# Patient Record
Sex: FEMALE | Race: WHITE | NOT HISPANIC OR LATINO | URBAN - METROPOLITAN AREA
[De-identification: names, ages, dates, MRNs, and addresses within clinical notes are randomized per-mention and may not be internally consistent; named-entity substitution may affect disease eponyms.]

---

## 2021-10-15 ENCOUNTER — HOSPITAL ENCOUNTER (OUTPATIENT)
Dept: RADIOLOGY | Facility: MEDICAL CENTER | Age: 31
End: 2021-10-15

## 2023-04-11 ENCOUNTER — HOSPITAL ENCOUNTER (OUTPATIENT)
Facility: MEDICAL CENTER | Age: 33
End: 2023-04-11
Attending: OBSTETRICS & GYNECOLOGY
Payer: COMMERCIAL

## 2023-04-11 ENCOUNTER — GYNECOLOGY VISIT (OUTPATIENT)
Dept: OBGYN | Facility: CLINIC | Age: 33
End: 2023-04-11
Payer: COMMERCIAL

## 2023-04-11 VITALS
HEIGHT: 67 IN | SYSTOLIC BLOOD PRESSURE: 125 MMHG | BODY MASS INDEX: 24.55 KG/M2 | WEIGHT: 156.4 LBS | DIASTOLIC BLOOD PRESSURE: 79 MMHG

## 2023-04-11 DIAGNOSIS — Z01.419 WOMEN'S ANNUAL ROUTINE GYNECOLOGICAL EXAMINATION: Primary | ICD-10-CM

## 2023-04-11 DIAGNOSIS — R10.2 PELVIC PAIN: ICD-10-CM

## 2023-04-11 DIAGNOSIS — Z01.419 WOMEN'S ANNUAL ROUTINE GYNECOLOGICAL EXAMINATION: ICD-10-CM

## 2023-04-11 PROCEDURE — 88175 CYTOPATH C/V AUTO FLUID REDO: CPT

## 2023-04-11 PROCEDURE — 87624 HPV HI-RISK TYP POOLED RSLT: CPT

## 2023-04-11 PROCEDURE — 99385 PREV VISIT NEW AGE 18-39: CPT | Performed by: OBSTETRICS & GYNECOLOGY

## 2023-04-11 PROCEDURE — 99203 OFFICE O/P NEW LOW 30 MIN: CPT | Mod: 25 | Performed by: OBSTETRICS & GYNECOLOGY

## 2023-04-11 NOTE — PROGRESS NOTES
ANNUAL GYNECOLOGY VISIT    Chief Complaint  Gynecologic Exam (Annual)      Subjective  Shantell Cueto is a 32 y.o. female who presents today for an Annual Exam.  Her main concern today is that her cycles are very painful.  Her cycles occur monthly and last about 3 or 4 days.  She does pass some clots on the first couple days of her cycle.  She has tried taking Aleve prior to her cycle.  Ultimately, she does not want to take any medications because she worries about the long-term effects of taking medications.  She also complains about mood swings about 10 days before cycle.  She has tried oral contraceptive pills in the past.  She feels like she is very sensitive to oral contraceptive pills and that it impacts her mood.  She is not interested in taking any oral contraceptive pills at this time.  She also sometimes has a right-sided pelvic pain about a week before her cycle. She currently uses condoms for contraception.  She denies any abnormal vaginal discharge or irritation.  She declines STD testing today.    Preventive Care   S/p HPV vaccine  Last Mammogram: NA    Gynecology History and ROS  Current Sexual Activity: Yes  History of sexually transmitted diseases?  no  Abnormal discharge? No  Current Contraception:  condoms    Menstrual History  Patient's last menstrual period was 2023.  Periods are regular  q 28 days, lasting 3-4 days.   Clots or heavy flow: Yes  Dysmenorrhea: Yes  Intermenstrual bleeding/spotting: No  Significant pain with periods:Yes  Bothersome PMS symptoms: Yes  Significant Pelvic Pain: Yes      Pap History  Last pap smear:  pap negative  History of moderate or severe dysplasia: No    Cancer Risk Assessement:  Family history of:   - Breast cancer: cousin had breast cancer at a young age, no one else in her family has has breast cancer   - Ovarian cancer: no   - Uterine cancer: no   - Colon cancer: no    Obstetric History  OB History    Para Term  AB Living   0 0 0 0 0 0    SAB IAB Ectopic Molar Multiple Live Births   0 0 0 0 0 0       Past Medical History  History reviewed. No pertinent past medical history.    Past Surgical History  History reviewed. No pertinent surgical history.    Social History  Social History     Socioeconomic History    Marital status: Single     Spouse name: Not on file    Number of children: Not on file    Years of education: Not on file    Highest education level: Not on file   Occupational History    Not on file   Tobacco Use    Smoking status: Never    Smokeless tobacco: Never   Vaping Use    Vaping Use: Never used   Substance and Sexual Activity    Alcohol use: Yes     Alcohol/week: 0.6 - 1.2 oz     Types: 1 - 2 Standard drinks or equivalent per week    Drug use: Yes     Types: Oral, Marijuana    Sexual activity: Yes     Partners: Male     Birth control/protection: Condom   Other Topics Concern    Not on file   Social History Narrative    Not on file     Social Determinants of Health     Financial Resource Strain: Not on file   Food Insecurity: Not on file   Transportation Needs: Not on file   Physical Activity: Not on file   Stress: Not on file   Social Connections: Not on file   Intimate Partner Violence: Not on file   Housing Stability: Not on file       Family History  Family History   Problem Relation Age of Onset    No Known Problems Mother     Cancer Father         prostate       Home Medications  No current outpatient medications on file prior to visit.     No current facility-administered medications on file prior to visit.       Allergies/Reactions  No Known Allergies    ROS  Positive ROS: Negative except as stated in the HPI  Gen: no fevers or chills, no significant weight loss or gain, excessive fatigue  Respiratory:  no cough or dyspnea  Cardiac:  no chest pain, no palpitations, no syncope  Breast: no breast discharge, pain, lump or skin changes  GI:  no heartburn, no nausea or vomiting  Urinary: no dysuria, urgency, frequency, incontinence  "  Psych: sometimes anxiety, uses CBD  Neuro: no migraines with aura, fainting spells, numbness or tingling  Extremities: no joint pain, persistently swollen ankles, recurrent leg cramps      Physical Examination:  Vital Signs:   Vitals:    04/11/23 1006   BP: 125/79   BP Location: Left arm   Patient Position: Sitting   BP Cuff Size: Adult   Weight: 156 lb 6.4 oz   Height: 5' 7\"     Body mass index is 24.5 kg/m².    Constitutional: The patient is well developed and well nourished.  Psychiatric: Patient is oriented to time place and person.   Skin: No rash observed.  Neck: Appears symmetric.  Respiratory: normal effort  Breast: Inspection reveals no asymetry or nipple discharge, no skin thickening, dimpling or erythema.  Palpation demonstrates no masses.  Abdomen: Soft, non-tender.  Pelvic Exam:     Vulva: external female genitalia are normal in appearance. No lesions    Urethra - no lesions, no erythema    Vagina: moist, pink, normal ruggae    Cervix: pink, smooth, no lesions, no CMT    Uterus - non-tender, normal size, shape, contour, mobile    Ovaries: non-tender, no appreciable masses  Pap Smear performed: Yes  Extremeties: Legs are symmetric and without tenderness. There is no edema present.    Assessment & Plan  Shantell Cueto is a 32 y.o. female who presents today for Annual Gyn Exam.     1. Women's annual routine gynecological examination  - THINPREP PAP WITH HPV; Future    -Breast and pelvic exam completed  -Pap: negative in 2021, desires repeat pap  -Mammogram: NA  -Colonoscopy: NA  -Advised on breast self awareness  -Currently using condoms for contraception    2. Pelvic pain  -I discussed that her symptoms may be consistent with endometriosis versus a uterine fibroid or potentially an ovarian cyst because she is experiencing right sided pelvic pain.  If she has endometriosis, transvaginal ultrasound may be normal.  The only way to prove if she has endometriosis, would be to obtain a biopsy during a " diagnostic laparoscopy.  Many times oral contraceptive pills can be used for painful cycles. She does not desire any hormonal management at this time. Recommend pelvic US for further investigation.  - US-PELVIC COMPLETE (TRANSABDOMINAL/TRANSVAGINAL) (COMBO); Future     Return:  Will call with pelvic US results.     Maria A Weems M.D.

## 2023-04-12 LAB
CYTOLOGY REG CYTOL: NORMAL
HPV HR 12 DNA CVX QL NAA+PROBE: NEGATIVE
HPV16 DNA SPEC QL NAA+PROBE: NEGATIVE
HPV18 DNA SPEC QL NAA+PROBE: NEGATIVE
SPECIMEN SOURCE: NORMAL

## 2023-05-12 ENCOUNTER — HOSPITAL ENCOUNTER (OUTPATIENT)
Dept: RADIOLOGY | Facility: MEDICAL CENTER | Age: 33
End: 2023-05-12
Attending: OBSTETRICS & GYNECOLOGY
Payer: COMMERCIAL

## 2023-05-12 DIAGNOSIS — R10.2 PELVIC PAIN: ICD-10-CM

## 2023-05-12 PROCEDURE — 76830 TRANSVAGINAL US NON-OB: CPT

## 2023-05-18 ENCOUNTER — TELEPHONE (OUTPATIENT)
Dept: OBGYN | Facility: CLINIC | Age: 33
End: 2023-05-18
Payer: COMMERCIAL

## 2023-05-18 NOTE — TELEPHONE ENCOUNTER
----- Message from Maria A Weems M.D. sent at 5/16/2023  5:26 PM PDT -----  Pelvic US is normal and does not indicate that she has any fibroids or ovarian cysts that could be causing her pelvic pain. She does not have Mychart. Please let her know. Thanks.  Maria A Weems M.D.      Called pt and notified as above, advised pt to call us back if her pain get worse or anything changes. Pt agreed and verbalized understanding.

## 2024-01-18 ENCOUNTER — GYNECOLOGY VISIT (OUTPATIENT)
Dept: OBGYN | Facility: CLINIC | Age: 34
End: 2024-01-18
Payer: COMMERCIAL

## 2024-01-18 VITALS
BODY MASS INDEX: 24.01 KG/M2 | DIASTOLIC BLOOD PRESSURE: 89 MMHG | WEIGHT: 153 LBS | HEIGHT: 67 IN | SYSTOLIC BLOOD PRESSURE: 114 MMHG

## 2024-01-18 DIAGNOSIS — Z31.41 FERTILITY TESTING: Primary | ICD-10-CM

## 2024-01-18 PROCEDURE — 3079F DIAST BP 80-89 MM HG: CPT | Performed by: OBSTETRICS & GYNECOLOGY

## 2024-01-18 PROCEDURE — 99213 OFFICE O/P EST LOW 20 MIN: CPT | Performed by: OBSTETRICS & GYNECOLOGY

## 2024-01-18 PROCEDURE — 3074F SYST BP LT 130 MM HG: CPT | Performed by: OBSTETRICS & GYNECOLOGY

## 2024-01-18 NOTE — PROGRESS NOTES
"PROBLEM GYNECOLOGY VISIT    Chief Complaint  Gynecologic Exam (Family planning, fertility testing)      Subjective  Shantell Cueto is a 33 y.o. female who presents today because she would like to discuss family-planning.  She presents with her partner, Trace.  They just bought a house and they are planning to get .  She is wondering if she should continue to plan her wedding or try to conceive.  She would like to discuss risks of age and pregnancy.  She has never had a sexually transmitted infection such as gonorrhea or chlamydia.  Her cycles are regular, occurring monthly.  She states that she can \"feel when she is ovulating.\"    Preventive Care   Immunization History   Administered Date(s) Administered    PFIZER BIVALENT SARS-COV-2 VACCINE (12+) 11/10/2022     Last Mammogram: NA    Gynecology History and ROS  Current Sexual Activity: Yes  History of sexually transmitted diseases?  no  Abnormal discharge? No    Pap History  Last pap smear: 2023 neg  History of moderate or severe dysplasia: No    Obstetric History  OB History    Para Term  AB Living   0 0 0 0 0 0   SAB IAB Ectopic Molar Multiple Live Births   0 0 0 0 0 0       Past Medical History  History reviewed. No pertinent past medical history.    Past Surgical History  History reviewed. No pertinent surgical history.    Social History  Social History     Socioeconomic History    Marital status: Single     Spouse name: Not on file    Number of children: Not on file    Years of education: Not on file    Highest education level: Not on file   Occupational History    Not on file   Tobacco Use    Smoking status: Never    Smokeless tobacco: Never   Vaping Use    Vaping Use: Never used   Substance and Sexual Activity    Alcohol use: Not Currently     Alcohol/week: 0.6 - 1.2 oz     Types: 1 - 2 Standard drinks or equivalent per week     Comment: occ    Drug use: Yes     Types: Oral, Marijuana     Comment: occ    Sexual activity: Yes     " "Partners: Male     Birth control/protection: Condom   Other Topics Concern    Not on file   Social History Narrative    Not on file     Social Determinants of Health     Financial Resource Strain: Not on file   Food Insecurity: Not on file   Transportation Needs: Not on file   Physical Activity: Not on file   Stress: Not on file   Social Connections: Not on file   Intimate Partner Violence: Not on file   Housing Stability: Not on file       Family History  Family History   Problem Relation Age of Onset    No Known Problems Mother     Cancer Father         prostate       Home Medications  No current outpatient medications on file prior to visit.     No current facility-administered medications on file prior to visit.       Allergies/Reactions  No Known Allergies    Physical Examination:  Vital Signs:   Vitals:    01/18/24 1257   BP: 114/89   BP Location: Left arm   Patient Position: Sitting   BP Cuff Size: Adult   Weight: 153 lb   Height: 5' 7\"     Body mass index is 23.96 kg/m².    Constitutional: The patient is well developed and well nourished.  Psychiatric: Patient is oriented to time place and person.   Skin: No rash observed.  Neck: Appears symmetric.  Respiratory: normal effort  Breast: Deferred  Abdomen: Soft, non-tender.  Pelvic Exam: Deferred  Extremeties: Legs are symmetric and without tenderness. There is no edema present.    Assessment & Plan  Shantell Cueto is a 33 y.o. female who presents today with questions about her fertility    1. Fertility testing  - I discussed that we cannot guarantee that a couple will be able to conceive.  Approximately 15% of infertility is unexplained.  Usually, workup occurs after a couple has been attempting to conceive without success.  [facility] ordered to evaluate for ovarian reserve.  I discussed that if this is normal, then this is reassuring.  However, it does not guarantee that she will be able to conceive naturally.  If mothers are older, greater than 35 years old at the " time of delivery, then risks include increased risk of chromosomal abnormalities in babies and difficulty with conceiving as ovarian reserve diminishes with age.  I discussed testing for rubella and varicella antibodies.  She plans to think about this.  All questions answered.  - ANTI-MULLERIAN HORMONE([facility]); Future     Return: Annually or PRN    Maria A Weems M.D.

## 2024-01-18 NOTE — PROGRESS NOTES
Patient here for GYN exam.  Family planning, fertility testing  LMP= 1/4/24  BCM: none  Last pap: 4/11/23-neg  Last mammogram if applies: 2021  Pharmacy verified

## 2024-02-07 ENCOUNTER — HOSPITAL ENCOUNTER (OUTPATIENT)
Dept: LAB | Facility: MEDICAL CENTER | Age: 34
End: 2024-02-07
Attending: OBSTETRICS & GYNECOLOGY
Payer: COMMERCIAL

## 2024-02-07 DIAGNOSIS — Z31.41 FERTILITY TESTING: ICD-10-CM

## 2024-02-07 PROCEDURE — 36415 COLL VENOUS BLD VENIPUNCTURE: CPT

## 2024-02-07 PROCEDURE — 82166 ASSAY ANTI-MULLERIAN HORM: CPT

## 2024-02-10 LAB — MIS SERPL-MCNC: 3.96 NG/ML (ref 0.18–11.71)

## 2024-02-12 ENCOUNTER — TELEPHONE (OUTPATIENT)
Dept: OBGYN | Facility: CLINIC | Age: 34
End: 2024-02-12
Payer: COMMERCIAL

## 2024-02-12 NOTE — TELEPHONE ENCOUNTER
----- Message from Maria A Weems M.D. sent at 2/12/2024  8:08 AM PST -----  AMA is normal. Please let her know. She does not have Mychart, thanks.  Note - Test: ANTI-MULLERIAN HORMONE([facility])     2/12/2024 1453  Called pt and notified as above. Pt verbalized understanding.